# Patient Record
Sex: FEMALE | URBAN - METROPOLITAN AREA
[De-identification: names, ages, dates, MRNs, and addresses within clinical notes are randomized per-mention and may not be internally consistent; named-entity substitution may affect disease eponyms.]

---

## 2024-04-22 ENCOUNTER — LAB (OUTPATIENT)
Dept: URBAN - METROPOLITAN AREA MEDICAL CENTER 31 | Facility: MEDICAL CENTER | Age: 39
End: 2024-04-22
Payer: COMMERCIAL

## 2024-04-22 DIAGNOSIS — R17 JAUNDICE: ICD-10-CM

## 2024-04-22 DIAGNOSIS — K70.10 ALCOHOLIC HEPATITIS: ICD-10-CM

## 2024-04-22 DIAGNOSIS — R93.3 ABN FINDINGS-GI TRACT: ICD-10-CM

## 2024-04-22 PROCEDURE — 99222 1ST HOSP IP/OBS MODERATE 55: CPT | Performed by: INTERNAL MEDICINE

## 2024-04-22 PROCEDURE — 99254 IP/OBS CNSLTJ NEW/EST MOD 60: CPT | Performed by: INTERNAL MEDICINE

## 2024-04-22 PROCEDURE — G8427 DOCREV CUR MEDS BY ELIG CLIN: HCPCS | Performed by: INTERNAL MEDICINE

## 2024-04-23 ENCOUNTER — LAB (OUTPATIENT)
Dept: URBAN - METROPOLITAN AREA MEDICAL CENTER 31 | Facility: MEDICAL CENTER | Age: 39
End: 2024-04-23
Payer: COMMERCIAL

## 2024-04-23 DIAGNOSIS — K70.10 ACUTE ALCOHOLIC HEPATITIS: ICD-10-CM

## 2024-04-23 PROCEDURE — 99232 SBSQ HOSP IP/OBS MODERATE 35: CPT | Performed by: INTERNAL MEDICINE

## 2024-04-24 ENCOUNTER — LAB (OUTPATIENT)
Dept: URBAN - METROPOLITAN AREA MEDICAL CENTER 31 | Facility: MEDICAL CENTER | Age: 39
End: 2024-04-24
Payer: COMMERCIAL

## 2024-04-24 DIAGNOSIS — K70.10 ACUTE ALCOHOLIC HEPATITIS: ICD-10-CM

## 2024-04-24 PROCEDURE — 99232 SBSQ HOSP IP/OBS MODERATE 35: CPT | Performed by: INTERNAL MEDICINE

## 2024-05-22 ENCOUNTER — OFFICE VISIT (OUTPATIENT)
Dept: URBAN - METROPOLITAN AREA CLINIC 115 | Facility: CLINIC | Age: 39
End: 2024-05-22
Payer: COMMERCIAL

## 2024-05-22 ENCOUNTER — DASHBOARD ENCOUNTERS (OUTPATIENT)
Age: 39
End: 2024-05-22

## 2024-05-22 VITALS
BODY MASS INDEX: 27.31 KG/M2 | TEMPERATURE: 98 F | HEIGHT: 68 IN | SYSTOLIC BLOOD PRESSURE: 133 MMHG | WEIGHT: 180.2 LBS | DIASTOLIC BLOOD PRESSURE: 92 MMHG | HEART RATE: 118 BPM

## 2024-05-22 DIAGNOSIS — D75.89 MACROCYTOSIS: ICD-10-CM

## 2024-05-22 DIAGNOSIS — K70.9 ALCOHOLIC LIVER DISEASE: ICD-10-CM

## 2024-05-22 DIAGNOSIS — L29.9 PRURITUS: ICD-10-CM

## 2024-05-22 PROBLEM — 397073000: Status: ACTIVE | Noted: 2024-05-22

## 2024-05-22 PROBLEM — 41309000: Status: ACTIVE | Noted: 2024-05-22

## 2024-05-22 PROBLEM — 1240423001: Status: ACTIVE | Noted: 2024-05-22

## 2024-05-22 PROCEDURE — 99214 OFFICE O/P EST MOD 30 MIN: CPT | Performed by: INTERNAL MEDICINE

## 2024-05-25 LAB
A/G RATIO: 1.1
AFP, SERUM, TUMOR MARKER: 3.6
ALBUMIN: 3.4
ALKALINE PHOSPHATASE: 188
ALT (SGPT): 72
AST (SGOT): 130
BASO (ABSOLUTE): 0.1
BASOS: 1
BILIRUBIN, TOTAL: 8.8
BUN/CREATININE RATIO: 7
BUN: 5
CALCIUM: 8.6
CARBON DIOXIDE, TOTAL: 19
CHLORIDE: 100
CREATININE: 0.72
EGFR: 108
EOS (ABSOLUTE): 0.2
EOS: 2
GLOBULIN, TOTAL: 3.1
GLUCOSE: 123
HEMATOCRIT: 37.2
HEMATOLOGY COMMENTS:: (no result)
HEMOGLOBIN: 12.7
HEP A AB, TOTAL: NEGATIVE
HEP B SURFACE AB, QUAL: NON REACTIVE
IMMATURE CELLS: (no result)
IMMATURE GRANS (ABS): 0.1
IMMATURE GRANULOCYTES: 1
INR: 1.1
LYMPHS (ABSOLUTE): 2.1
LYMPHS: 25
MCH: 36.1
MCHC: 34.1
MCV: 106
MONOCYTES(ABSOLUTE): 0.7
MONOCYTES: 8
NEUTROPHILS (ABSOLUTE): 5.4
NEUTROPHILS: 63
NRBC: (no result)
PLATELETS: 326
POTASSIUM: 4.2
PROTEIN, TOTAL: 6.5
PROTHROMBIN TIME: 11.4
RBC: 3.52
RDW: 12.2
SODIUM: 133
WBC: 8.5

## 2024-05-29 ENCOUNTER — LAB OUTSIDE AN ENCOUNTER (OUTPATIENT)
Dept: URBAN - METROPOLITAN AREA CLINIC 115 | Facility: CLINIC | Age: 39
End: 2024-05-29

## 2024-06-03 ENCOUNTER — TELEPHONE ENCOUNTER (OUTPATIENT)
Dept: URBAN - METROPOLITAN AREA CLINIC 115 | Facility: CLINIC | Age: 39
End: 2024-06-03

## 2024-06-03 ENCOUNTER — LAB OUTSIDE AN ENCOUNTER (OUTPATIENT)
Dept: URBAN - METROPOLITAN AREA CLINIC 115 | Facility: CLINIC | Age: 39
End: 2024-06-03

## 2024-06-05 ENCOUNTER — LAB OUTSIDE AN ENCOUNTER (OUTPATIENT)
Dept: URBAN - METROPOLITAN AREA CLINIC 115 | Facility: CLINIC | Age: 39
End: 2024-06-05

## 2024-06-12 ENCOUNTER — LAB OUTSIDE AN ENCOUNTER (OUTPATIENT)
Dept: URBAN - METROPOLITAN AREA CLINIC 115 | Facility: CLINIC | Age: 39
End: 2024-06-12

## 2024-06-19 ENCOUNTER — OFFICE VISIT (OUTPATIENT)
Dept: URBAN - METROPOLITAN AREA CLINIC 82 | Facility: CLINIC | Age: 39
End: 2024-06-19

## 2024-10-19 ENCOUNTER — CLAIMS CREATED FROM THE CLAIM WINDOW (OUTPATIENT)
Dept: URBAN - METROPOLITAN AREA MEDICAL CENTER 31 | Facility: MEDICAL CENTER | Age: 39
End: 2024-10-19
Payer: COMMERCIAL

## 2024-10-19 DIAGNOSIS — R93.3 ABN FINDINGS-GI TRACT: ICD-10-CM

## 2024-10-19 DIAGNOSIS — K70.10 ALCOHOLIC HEPATITIS: ICD-10-CM

## 2024-10-19 PROCEDURE — G8427 DOCREV CUR MEDS BY ELIG CLIN: HCPCS | Performed by: INTERNAL MEDICINE

## 2024-10-19 PROCEDURE — 99254 IP/OBS CNSLTJ NEW/EST MOD 60: CPT | Performed by: INTERNAL MEDICINE

## 2024-10-19 PROCEDURE — 99222 1ST HOSP IP/OBS MODERATE 55: CPT | Performed by: INTERNAL MEDICINE

## 2024-10-20 ENCOUNTER — TELEPHONE ENCOUNTER (OUTPATIENT)
Dept: URBAN - METROPOLITAN AREA CLINIC 54 | Facility: CLINIC | Age: 39
End: 2024-10-20

## 2024-10-20 ENCOUNTER — CLAIMS CREATED FROM THE CLAIM WINDOW (OUTPATIENT)
Dept: URBAN - METROPOLITAN AREA MEDICAL CENTER 31 | Facility: MEDICAL CENTER | Age: 39
End: 2024-10-20
Payer: COMMERCIAL

## 2024-10-20 DIAGNOSIS — K70.10 ALCOHOLIC HEPATITIS: ICD-10-CM

## 2024-10-20 PROCEDURE — 99232 SBSQ HOSP IP/OBS MODERATE 35: CPT | Performed by: INTERNAL MEDICINE

## 2024-10-23 ENCOUNTER — TELEPHONE ENCOUNTER (OUTPATIENT)
Dept: URBAN - METROPOLITAN AREA CLINIC 115 | Facility: CLINIC | Age: 39
End: 2024-10-23

## 2024-12-18 ENCOUNTER — OFFICE VISIT (OUTPATIENT)
Dept: URBAN - NONMETROPOLITAN AREA CLINIC 4 | Facility: CLINIC | Age: 39
End: 2024-12-18

## 2025-02-17 ENCOUNTER — OFFICE VISIT (OUTPATIENT)
Dept: URBAN - METROPOLITAN AREA CLINIC 82 | Facility: CLINIC | Age: 40
End: 2025-02-17
Payer: COMMERCIAL

## 2025-02-17 VITALS
SYSTOLIC BLOOD PRESSURE: 159 MMHG | BODY MASS INDEX: 24.86 KG/M2 | DIASTOLIC BLOOD PRESSURE: 112 MMHG | TEMPERATURE: 98.1 F | HEART RATE: 112 BPM | WEIGHT: 164 LBS | HEIGHT: 68 IN

## 2025-02-17 DIAGNOSIS — K70.9 ALCOHOLIC LIVER DISEASE: ICD-10-CM

## 2025-02-17 PROCEDURE — 99214 OFFICE O/P EST MOD 30 MIN: CPT | Performed by: STUDENT IN AN ORGANIZED HEALTH CARE EDUCATION/TRAINING PROGRAM

## 2025-02-17 RX ORDER — DISULFIRAM 250 MG/1
1 TABLET TABLET ORAL ONCE A DAY
Qty: 30 | OUTPATIENT
Start: 2025-02-18 | End: 2025-03-20

## 2025-02-17 RX ORDER — FOLIC ACID 1 MG/1
1 TABLET TABLET ORAL ONCE A DAY
Qty: 30 | OUTPATIENT
Start: 2025-02-18 | End: 2025-03-20

## 2025-02-17 RX ORDER — THIAMINE HCL TAB 100 MG 100 MG
1 TABLET TAB ORAL ONCE A DAY
Qty: 30 | OUTPATIENT
Start: 2025-02-18 | End: 2025-03-20

## 2025-02-17 NOTE — PHYSICAL EXAM CONSTITUTIONAL:
NAD, alert and oriented, well developed, well nourished, ambulating without difficulty, sitting comfortably in the chair, mother and sister by side

## 2025-02-20 ENCOUNTER — TELEPHONE ENCOUNTER (OUTPATIENT)
Dept: URBAN - METROPOLITAN AREA CLINIC 82 | Facility: CLINIC | Age: 40
End: 2025-02-20

## 2025-02-20 ENCOUNTER — LAB OUTSIDE AN ENCOUNTER (OUTPATIENT)
Dept: URBAN - METROPOLITAN AREA CLINIC 82 | Facility: CLINIC | Age: 40
End: 2025-02-20

## 2025-02-21 ENCOUNTER — LAB OUTSIDE AN ENCOUNTER (OUTPATIENT)
Dept: URBAN - METROPOLITAN AREA CLINIC 23 | Facility: CLINIC | Age: 40
End: 2025-02-21

## 2025-02-25 ENCOUNTER — OFFICE VISIT (OUTPATIENT)
Dept: URBAN - METROPOLITAN AREA CLINIC 86 | Facility: CLINIC | Age: 40
End: 2025-02-25
Payer: COMMERCIAL

## 2025-02-25 VITALS
DIASTOLIC BLOOD PRESSURE: 91 MMHG | HEIGHT: 68 IN | BODY MASS INDEX: 24.55 KG/M2 | TEMPERATURE: 98.6 F | WEIGHT: 162 LBS | RESPIRATION RATE: 96 BRPM | HEART RATE: 114 BPM | SYSTOLIC BLOOD PRESSURE: 146 MMHG

## 2025-02-25 DIAGNOSIS — R77.2 ELEVATED AFP: ICD-10-CM

## 2025-02-25 DIAGNOSIS — R17 ELEVATED BILIRUBIN: ICD-10-CM

## 2025-02-25 DIAGNOSIS — K70.9 ALCOHOLIC LIVER DISEASE: ICD-10-CM

## 2025-02-25 DIAGNOSIS — L29.9 PRURITUS: ICD-10-CM

## 2025-02-25 DIAGNOSIS — D75.89 MACROCYTOSIS: ICD-10-CM

## 2025-02-25 PROCEDURE — 99214 OFFICE O/P EST MOD 30 MIN: CPT | Performed by: PHYSICIAN ASSISTANT

## 2025-02-25 RX ORDER — DISULFIRAM 250 MG/1
1 TABLET TABLET ORAL ONCE A DAY
Qty: 30 | Status: ACTIVE | COMMUNITY
Start: 2025-02-18 | End: 2025-03-20

## 2025-02-25 RX ORDER — FOLIC ACID 1 MG/1
1 TABLET TABLET ORAL ONCE A DAY
Qty: 30 | Status: ACTIVE | COMMUNITY
Start: 2025-02-18 | End: 2025-03-20

## 2025-02-25 RX ORDER — THIAMINE HCL TAB 100 MG 100 MG
1 TABLET TAB ORAL ONCE A DAY
Qty: 30 | Status: ACTIVE | COMMUNITY
Start: 2025-02-18 | End: 2025-03-20

## 2025-02-25 NOTE — HPI-TODAY'S VISIT:
40 yo female w/ ETOH cirrhosis here fore eval.   COMPARISON: No prior study for comparison.         FINDINGS:        LOWER CHEST: No pleural effusions.        LIVER: Liver measures 22.7 cm in craniocaudad dimension. Minimal nodular contour along the anterior liver margin may be due to underlying cirrhosis. No suspicious liver lesion. Severe heterogeneous fatty infiltration.        BILIARY: The gallbladder is present. No dilatation of the common bile duct.        SPLEEN:  Unremarkable        PANCREAS: No main pancreatic duct dilatation. No suspicious mass lesion.        ADRENAL GLANDS:  Unremarkable        KIDNEYS:No suspicious mass or hydronephrosis.        LYMPH NODES: No lymphadenopathy by size criteria.        PERITONEUM/MESENTERY: No abdominal ascites.        GI TRACT: The visualized bowel is nondilated.        VASCULAR: No abdominal aortic aneurysm. The main left and right portal veins, main portal vein, splenic and superior mesenteric veins are patent.        BONES/SOFT TISSUES:  No aggressive osseous lesion. There is a 1.3 x 3.3 cm nonspecific septated cystic lesion in the right posterior paraspinal musculature on series 7 image 2 which has some minimal internal enhancement seen on series 27 image numbers 33 through 37 and series 27 image numbers 24 through 29.        OTHER: Unremarkable          IMPRESSION:        1.    Hepatomegaly with findings suggesting underlying cirrhosis and severe diffuse heterogeneous fatty infiltration of the liver.        2.    No suspicious liver lesion.        3.    8.9 x 1.3 x 3.3 cm nonspecific mildly complex cystic lesion within the right posterior paraspinal musculature of uncertain etiology. A cystic sarcoma cannot be excluded. Given the mild internal enhancement follow-up MRI and 4 months is recommended to ensure stability. Surgical consultation would be of benefit.        4.    These findings were discussed by phone with Kimberly Tram Connor BUSH on 2/21/2025 4:08 PM by Bartolo Ovalle MD.  2/17/25 39 y.o female w PMH of alcoholic hepatitis presents today for F/U. She was seen initially by Dr. Harvey back on 05/2024, was not compliance w/ two office F/U and additional labs. She went back to hospital on10/2024 for jaundice, elevated total hilaria 20s. Meld sodium is 24 and maddrey score is 32. Was sent home w/ prednisolone 40mg back in 10/2024 but did not return for additional labs to calculate patient's response to therapy using the Lille score. Admits compliance to med for 28days. Patient states that she is still occasionally drinking. Has been drinking a bit more after her divorce.  Last drink was 1 week ago. Has been trying to avoid ETOH but still craves it at time, from last visit, patient states that Dr. Harvey rec medication to help w/ abstaining from alcohol, ok to try it this round. Has not been evaluated by Dr. Lockwood yet. She currently denies of any abd pain, NV. States that her jaundice has improved signf from when she was at the hospital. BM daily, no melena or hematochezia. No confusion, weakness. Last CT A.P 10/2024: hepatomegaly and irregular markedly reduced liver denisty, no masses in the liver parenchyma, slight wall thickening/enhancement of the GB w/ small amount of hyperdense material in the lumen likely sludge. No stones. No dilated ducts. Spleen/pancreas normal. Paraumbilical vein recanalized, suggesting early portal hypertension, no other varices noted.

## 2025-02-26 ENCOUNTER — TELEPHONE ENCOUNTER (OUTPATIENT)
Dept: URBAN - METROPOLITAN AREA CLINIC 82 | Facility: CLINIC | Age: 40
End: 2025-02-26

## 2025-02-27 ENCOUNTER — TELEPHONE ENCOUNTER (OUTPATIENT)
Dept: URBAN - METROPOLITAN AREA CLINIC 82 | Facility: CLINIC | Age: 40
End: 2025-02-27

## 2025-02-27 ENCOUNTER — OFFICE VISIT (OUTPATIENT)
Dept: URBAN - NONMETROPOLITAN AREA CLINIC 4 | Facility: CLINIC | Age: 40
End: 2025-02-27